# Patient Record
Sex: FEMALE | URBAN - METROPOLITAN AREA
[De-identification: names, ages, dates, MRNs, and addresses within clinical notes are randomized per-mention and may not be internally consistent; named-entity substitution may affect disease eponyms.]

---

## 2018-06-21 ENCOUNTER — OFFICE VISIT (OUTPATIENT)
Dept: URBAN - METROPOLITAN AREA CLINIC 88 | Facility: CLINIC | Age: 53
End: 2018-06-21
Payer: MEDICARE

## 2018-06-21 DIAGNOSIS — H00.15 CHALAZION LEFT LOWER EYELID: ICD-10-CM

## 2018-06-21 DIAGNOSIS — H40.013 OPEN ANGLE WITH BORDERLINE FINDINGS, LOW RISK, BILATERAL: ICD-10-CM

## 2018-06-21 PROCEDURE — 99204 OFFICE O/P NEW MOD 45 MIN: CPT | Performed by: OPTOMETRIST

## 2018-06-21 RX ORDER — GABAPENTIN 300 MG/1
300 MG CAPSULE ORAL
Qty: 0 | Refills: 0 | Status: INACTIVE
Start: 2018-06-21 | End: 2020-06-18

## 2018-06-21 ASSESSMENT — VISUAL ACUITY
OD: 20/30
OS: 20/25

## 2018-06-21 ASSESSMENT — INTRAOCULAR PRESSURE
OS: 14
OD: 14

## 2018-06-21 ASSESSMENT — KERATOMETRY
OS: 44.50
OD: 44.63

## 2018-06-21 NOTE — IMPRESSION/PLAN
Impression: Other specified inflammations of eyelid: H01.8. Plan: Discussed cause of ocular irritation with patient in detail. Lid hygiene to be performed with baby shampoo and a soft cloth to clean the lash and lid margin. Recommend warm compresses. Patient understands this is a chronic condition that will require daily lid cleaning.

## 2018-06-21 NOTE — IMPRESSION/PLAN
Impression: Age-related nuclear cataract, bilateral: H25.13. Plan: Reviewed status of cataract with patient and potential effect on visual potential. No treatment at this time. Patient will monitor for any decrease in vision.

## 2018-06-21 NOTE — IMPRESSION/PLAN
Impression: Open angle with borderline findings, low risk, bilateral: H40.013. Plan: Discussed status of examination with patient. Recommend glaucoma workup with pachymetry, gonioscopy, VF 24-2, and ON OCT in 1 month. Patient understands risk associated with condition and need for monitoring.

## 2018-07-19 ENCOUNTER — OFFICE VISIT (OUTPATIENT)
Dept: URBAN - METROPOLITAN AREA CLINIC 88 | Facility: CLINIC | Age: 53
End: 2018-07-19
Payer: MEDICARE

## 2018-07-19 PROCEDURE — 99213 OFFICE O/P EST LOW 20 MIN: CPT | Performed by: OPTOMETRIST

## 2018-07-19 PROCEDURE — 92133 CPTRZD OPH DX IMG PST SGM ON: CPT | Performed by: OPTOMETRIST

## 2018-07-19 PROCEDURE — 92083 EXTENDED VISUAL FIELD XM: CPT | Performed by: OPTOMETRIST

## 2018-07-19 PROCEDURE — 76514 ECHO EXAM OF EYE THICKNESS: CPT | Performed by: OPTOMETRIST

## 2018-07-19 ASSESSMENT — INTRAOCULAR PRESSURE
OS: 14
OD: 15

## 2018-07-19 NOTE — IMPRESSION/PLAN
Impression: Open angle with borderline findings, low risk, bilateral: H40.013. Plan: Discussed status of examination with patient. VF today questionable reliability OD, good OS- shallow loss OD, focal loss OS, baseline OCT ON OU- no thinning OU, pachy OU today- thin OU. Patient understands risk associated with condition and need for monitoring. RTC 6 months for VF 24-2, OCT ON and IOP check.

## 2018-10-25 ENCOUNTER — OFFICE VISIT (OUTPATIENT)
Dept: URBAN - METROPOLITAN AREA CLINIC 88 | Facility: CLINIC | Age: 53
End: 2018-10-25
Payer: MEDICARE

## 2018-10-25 DIAGNOSIS — H01.8 OTHER SPECIFIED INFLAMMATIONS OF EYELID: Primary | ICD-10-CM

## 2018-10-25 DIAGNOSIS — H10.413 CONJUNCTIVITIS - ALLERGIC: ICD-10-CM

## 2018-10-25 DIAGNOSIS — H02.052 TRICHIASIS WITHOUT ENTROPION RIGHT LOWER EYELID: ICD-10-CM

## 2018-10-25 PROCEDURE — 67820 REVISE EYELASHES: CPT | Performed by: OPTOMETRIST

## 2018-10-25 PROCEDURE — 99213 OFFICE O/P EST LOW 20 MIN: CPT | Performed by: OPTOMETRIST

## 2018-10-25 ASSESSMENT — INTRAOCULAR PRESSURE
OD: 13
OS: 13

## 2018-10-25 NOTE — IMPRESSION/PLAN
Impression: Other specified inflammations of eyelid: H01.8. Plan: Discussed cause of ocular irritation with patient in detail. Lid hygiene to be performed with baby shampoo and a soft cloth to clean the lash and lid margin. Recommend warm compresses and claridex wipes. Patient understands this is a chronic condition that will require daily lid cleaning.

## 2019-01-18 ENCOUNTER — OFFICE VISIT (OUTPATIENT)
Dept: URBAN - METROPOLITAN AREA CLINIC 88 | Facility: CLINIC | Age: 54
End: 2019-01-18
Payer: MEDICARE

## 2019-01-18 PROCEDURE — 99213 OFFICE O/P EST LOW 20 MIN: CPT | Performed by: OPTOMETRIST

## 2019-01-18 PROCEDURE — 92083 EXTENDED VISUAL FIELD XM: CPT | Performed by: OPTOMETRIST

## 2019-01-18 PROCEDURE — 92133 CPTRZD OPH DX IMG PST SGM ON: CPT | Performed by: OPTOMETRIST

## 2019-01-18 ASSESSMENT — VISUAL ACUITY
OD: 20/50
OS: 20/50

## 2019-01-18 ASSESSMENT — INTRAOCULAR PRESSURE
OD: 13
OD: 14
OS: 16
OS: 12

## 2019-01-18 NOTE — IMPRESSION/PLAN
Impression: Open angle with borderline findings, low risk, bilateral: H40.013. Plan: Discussed status of examination with patient. VF today questionable reliability superior arcuate pattern OD does not match OCT, repeatable shallow loss OS. OCT ON stable OU. Patient understands risk associated with condition and need for monitoring. RTC 1 month for VF 24-2, refraction and IOP check.

## 2019-02-15 ENCOUNTER — OFFICE VISIT (OUTPATIENT)
Dept: URBAN - METROPOLITAN AREA CLINIC 88 | Facility: CLINIC | Age: 54
End: 2019-02-15
Payer: MEDICARE

## 2019-02-15 PROCEDURE — 92083 EXTENDED VISUAL FIELD XM: CPT | Performed by: OPTOMETRIST

## 2019-02-15 PROCEDURE — 99213 OFFICE O/P EST LOW 20 MIN: CPT | Performed by: OPTOMETRIST

## 2019-02-15 ASSESSMENT — VISUAL ACUITY
OS: 20/30
OD: 20/40

## 2019-02-15 ASSESSMENT — INTRAOCULAR PRESSURE
OS: 12
OD: 12

## 2019-02-15 NOTE — IMPRESSION/PLAN
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 Plan: Discussed condition with patient in detail. Continue Preservative Free Artificial tears QID (Systane or Refresh Brand recommended) and sleeping mask. Genteal Gel QHS OU. Discussed punctal plug option and Xiidra. Deferred today. RTC if symptoms continue.

## 2019-02-15 NOTE — IMPRESSION/PLAN
Impression: Open angle with borderline findings, low risk, bilateral: H40.013. Plan: Discussed status of examination with patient. VF today questionable reliability, improved vs last. OCT ON stable OU. Patient understands risk associated with condition and need for monitoring. RTC 3 months for OCT ON.

## 2019-05-16 ENCOUNTER — OFFICE VISIT (OUTPATIENT)
Dept: URBAN - METROPOLITAN AREA CLINIC 88 | Facility: CLINIC | Age: 54
End: 2019-05-16
Payer: MEDICARE

## 2019-05-16 DIAGNOSIS — H16.143 PUNCTATE KERATITIS, BILATERAL: ICD-10-CM

## 2019-05-16 DIAGNOSIS — H16.113 MACULAR KERATITIS, BILATERAL: ICD-10-CM

## 2019-05-16 PROCEDURE — 92133 CPTRZD OPH DX IMG PST SGM ON: CPT | Performed by: OPTOMETRIST

## 2019-05-16 PROCEDURE — 68761 CLOSE TEAR DUCT OPENING: CPT | Performed by: OPTOMETRIST

## 2019-05-16 PROCEDURE — 99213 OFFICE O/P EST LOW 20 MIN: CPT | Performed by: OPTOMETRIST

## 2019-05-16 ASSESSMENT — INTRAOCULAR PRESSURE
OD: 13
OS: 13

## 2019-05-16 ASSESSMENT — VISUAL ACUITY
OD: 20/40
OS: 20/40

## 2019-05-16 NOTE — IMPRESSION/PLAN
Impression: Open angle with borderline findings, low risk, bilateral: H40.013. Plan: Discussed status of examination with patient. VF today questionable reliability, improved vs last. OCT ON stable OU. Patient understands risk associated with condition and need for monitoring.

## 2019-05-16 NOTE — IMPRESSION/PLAN
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 Plan: Discussed condition with patient in detail. Continue Preservative Free Artificial tears QID (Systane or Refresh Brand recommended) and sleeping mask. Genteal Gel QHS OU. Punctal plugs RLL, WU today with consent and without incident. Potential risk reviewed. bvi Parasol small 0.35-0.65mm. LLL puncta stenosis, no plug needed. igtt Ofloxacin before and after procedure. RTC if symptoms continue.

## 2019-08-16 ENCOUNTER — OFFICE VISIT (OUTPATIENT)
Dept: URBAN - METROPOLITAN AREA CLINIC 88 | Facility: CLINIC | Age: 54
End: 2019-08-16
Payer: MEDICARE

## 2019-08-16 DIAGNOSIS — H51.8: ICD-10-CM

## 2019-08-16 PROCEDURE — 99212 OFFICE O/P EST SF 10 MIN: CPT | Performed by: OPTOMETRIST

## 2019-08-16 ASSESSMENT — VISUAL ACUITY
OS: 20/40
OD: 20/40

## 2019-08-16 ASSESSMENT — INTRAOCULAR PRESSURE
OS: 11
OD: 11

## 2020-06-18 ENCOUNTER — OFFICE VISIT (OUTPATIENT)
Dept: URBAN - METROPOLITAN AREA CLINIC 88 | Facility: CLINIC | Age: 55
End: 2020-06-18
Payer: MEDICARE

## 2020-06-18 DIAGNOSIS — H25.13 AGE-RELATED NUCLEAR CATARACT, BILATERAL: ICD-10-CM

## 2020-06-18 PROCEDURE — 99214 OFFICE O/P EST MOD 30 MIN: CPT | Performed by: OPTOMETRIST

## 2020-06-18 PROCEDURE — 92133 CPTRZD OPH DX IMG PST SGM ON: CPT | Performed by: OPTOMETRIST

## 2020-06-18 ASSESSMENT — INTRAOCULAR PRESSURE
OS: 13
OD: 13

## 2020-06-18 NOTE — IMPRESSION/PLAN
Impression: Open angle with borderline findings, low risk, bilateral: H40.013. Plan: Discussed status of examination with patient. Risk based on CD ratio. OCT ON stable OU. Low IOP Patient understands risk associated with condition and need for monitoring.